# Patient Record
Sex: FEMALE
[De-identification: names, ages, dates, MRNs, and addresses within clinical notes are randomized per-mention and may not be internally consistent; named-entity substitution may affect disease eponyms.]

---

## 2017-03-20 ENCOUNTER — CHARTING TRANS (OUTPATIENT)
Dept: OTHER | Age: 42
End: 2017-03-20

## 2017-05-19 PROBLEM — Z80.3 FAMILY HISTORY OF BREAST CANCER IN MOTHER: Status: ACTIVE | Noted: 2017-05-19

## 2017-05-19 PROCEDURE — 87624 HPV HI-RISK TYP POOLED RSLT: CPT | Performed by: OBSTETRICS & GYNECOLOGY

## 2017-05-19 PROCEDURE — 88175 CYTOPATH C/V AUTO FLUID REDO: CPT | Performed by: OBSTETRICS & GYNECOLOGY

## 2017-08-30 ENCOUNTER — HOSPITAL ENCOUNTER (OUTPATIENT)
Dept: MAMMOGRAPHY | Age: 42
Discharge: HOME | End: 2017-08-30
Attending: OBSTETRICS & GYNECOLOGY
Payer: COMMERCIAL

## 2017-08-30 DIAGNOSIS — Z12.31 ENCOUNTER FOR SCREENING MAMMOGRAM FOR MALIGNANT NEOPLASM OF BREAST: ICD-10-CM

## 2017-08-30 PROCEDURE — 77067 SCR MAMMO BI INCL CAD: CPT | Performed by: OBSTETRICS & GYNECOLOGY

## 2017-08-30 PROCEDURE — 77063 BREAST TOMOSYNTHESIS BI: CPT | Performed by: OBSTETRICS & GYNECOLOGY

## 2018-01-07 ENCOUNTER — HOSPITAL ENCOUNTER (EMERGENCY)
Age: 43
Discharge: HOME | End: 2018-01-07
Payer: COMMERCIAL

## 2018-01-07 ENCOUNTER — APPOINTMENT (OUTPATIENT)
Dept: GENERAL RADIOLOGY | Age: 43
End: 2018-01-07
Attending: EMERGENCY MEDICINE
Payer: COMMERCIAL

## 2018-01-07 VITALS
DIASTOLIC BLOOD PRESSURE: 77 MMHG | BODY MASS INDEX: 20 KG/M2 | HEART RATE: 66 BPM | WEIGHT: 141 LBS | TEMPERATURE: 100 F | SYSTOLIC BLOOD PRESSURE: 140 MMHG | OXYGEN SATURATION: 100 % | RESPIRATION RATE: 16 BRPM

## 2018-01-07 DIAGNOSIS — S63.501A RIGHT WRIST SPRAIN, INITIAL ENCOUNTER: Primary | ICD-10-CM

## 2018-01-07 PROCEDURE — 73110 X-RAY EXAM OF WRIST: CPT | Performed by: EMERGENCY MEDICINE

## 2018-01-07 PROCEDURE — 99283 EMERGENCY DEPT VISIT LOW MDM: CPT

## 2018-01-07 RX ORDER — IBUPROFEN 600 MG/1
600 TABLET ORAL ONCE
Status: COMPLETED | OUTPATIENT
Start: 2018-01-07 | End: 2018-01-07

## 2018-01-08 PROBLEM — S69.91XA INJURY OF RIGHT WRIST, INITIAL ENCOUNTER: Status: ACTIVE | Noted: 2018-01-08

## 2018-01-08 NOTE — ED PROVIDER NOTES
Patient Seen in: [facility] Emergency Department In [place]    History   Patient presents with:  Upper Extremity Injury (musculoskeletal)    Stated Complaint: right wrist injury/fall    HPI    CHIEF COMPLAINT: Right wrist pain status post fall    HISTORY Physical Exam   ED Triage Vitals [01/07/18 8238]  BP: (!) 161/94  Pulse: 63  Resp: 18  Temp: 99.8 °F (37.7 °C)  Temp src: Temporal  SpO2: 100 %  O2 Device: None (Room air)    Current:BP (!) 161/94   Pulse 63   Temp 99.8 °F (37.7 °C) (Temporal)   Resp 18 Patiently placed in a Velcro wrist splint with thumb spica and have close follow-up with orthopedics. No acute fracture seen on x-ray. I discussed the radiology results with the patient.  I discussed the diagnosis and need for followup with their physicia

## 2018-01-23 PROBLEM — S69.91XD INJURY OF RIGHT WRIST, SUBSEQUENT ENCOUNTER: Status: ACTIVE | Noted: 2018-01-23

## 2018-01-30 PROBLEM — S52.501A CLOSED FRACTURE OF DISTAL END OF RIGHT RADIUS, UNSPECIFIED FRACTURE MORPHOLOGY, INITIAL ENCOUNTER: Status: ACTIVE | Noted: 2018-01-30

## 2018-02-16 PROBLEM — S52.501D CLOSED FRACTURE OF DISTAL END OF RIGHT RADIUS WITH ROUTINE HEALING, UNSPECIFIED FRACTURE MORPHOLOGY, SUBSEQUENT ENCOUNTER: Status: ACTIVE | Noted: 2018-02-16

## 2018-03-12 PROBLEM — M25.531 RIGHT WRIST PAIN: Status: ACTIVE | Noted: 2018-03-12

## 2018-05-09 PROCEDURE — 87624 HPV HI-RISK TYP POOLED RSLT: CPT | Performed by: OBSTETRICS & GYNECOLOGY

## 2018-05-09 PROCEDURE — 88175 CYTOPATH C/V AUTO FLUID REDO: CPT | Performed by: OBSTETRICS & GYNECOLOGY

## 2018-05-29 PROBLEM — M77.8 TENDINITIS OF RIGHT WRIST: Status: ACTIVE | Noted: 2018-05-29

## 2018-10-24 ENCOUNTER — OFFICE VISIT (OUTPATIENT)
Dept: FAMILY MEDICINE CLINIC | Facility: CLINIC | Age: 43
End: 2018-10-24
Payer: COMMERCIAL

## 2018-10-24 VITALS
DIASTOLIC BLOOD PRESSURE: 78 MMHG | HEIGHT: 70.87 IN | OXYGEN SATURATION: 98 % | HEART RATE: 64 BPM | RESPIRATION RATE: 20 BRPM | WEIGHT: 138 LBS | BODY MASS INDEX: 19.32 KG/M2 | TEMPERATURE: 98 F | SYSTOLIC BLOOD PRESSURE: 104 MMHG

## 2018-10-24 DIAGNOSIS — R39.9 UTI SYMPTOMS: Primary | ICD-10-CM

## 2018-10-24 PROCEDURE — 81003 URINALYSIS AUTO W/O SCOPE: CPT | Performed by: NURSE PRACTITIONER

## 2018-10-24 PROCEDURE — 99202 OFFICE O/P NEW SF 15 MIN: CPT | Performed by: NURSE PRACTITIONER

## 2018-10-24 PROCEDURE — 87186 SC STD MICRODIL/AGAR DIL: CPT | Performed by: NURSE PRACTITIONER

## 2018-10-24 PROCEDURE — 87088 URINE BACTERIA CULTURE: CPT | Performed by: NURSE PRACTITIONER

## 2018-10-24 PROCEDURE — 87086 URINE CULTURE/COLONY COUNT: CPT | Performed by: NURSE PRACTITIONER

## 2018-10-24 RX ORDER — NITROFURANTOIN 25; 75 MG/1; MG/1
100 CAPSULE ORAL 2 TIMES DAILY
Qty: 14 CAPSULE | Refills: 0 | Status: SHIPPED | OUTPATIENT
Start: 2018-10-24 | End: 2018-10-31

## 2018-10-24 NOTE — PROGRESS NOTES
CHIEF COMPLAINT:   Patient presents with:  Urinary Frequency: s/s for 3 days. OTC AZO used. Burning On Urination: very mild    HPI:   Jesus Saha is a 44year old female who presents with symptoms of UTI.  Complaining of urinary frequency, urge Fluocinolone Acetonide (DERMA-SMOOTHE/FS SCALP) 0.01 % Apply Externally Oil dampen hair, then massage thoroughly into scalp and cover with shower cap/ leave on overnight and wash out in AM Disp: 118 mL Rfl: 1      History reviewed.  No pertinent past medica [employer] Expiration Date 08/31/2019 Date         ASSESSMENT AND PLAN:   Yoandy Ferrera is a 44year old female presents with UTI symptoms. ASSESSMENT:  Uti symptoms  (primary encounter diagnosis)  1.  UTI symptoms  - URINALYSIS, AUTO, W/O SCOPE  - Symptoms  The infection causes inflammation in the urethra and bladder. This causes many of the symptoms.  The most common symptoms of a bladder infection are:  · Pain or burning when urinating  · Having to urinate more often than usual  · Urgent need to ur · You can use acetaminophen or ibuprofen for pain, fever, or discomfort, unless another medicine was prescribed. If you have chronic liver or kidney disease, talk with your healthcare provider before using these medicines.  Also talk with your provider if y · Fainting or loss of consciousness  · Rapid heart rate  When to seek medical advice  Call your healthcare provider right away if any of these occur:  · Fever of 100.4ºF (38. 0ºC) or higher, or as directed by your healthcare provider  · Symptoms are not bet

## 2018-11-05 VITALS
HEART RATE: 64 BPM | RESPIRATION RATE: 18 BRPM | HEIGHT: 70 IN | TEMPERATURE: 97.3 F | WEIGHT: 152 LBS | SYSTOLIC BLOOD PRESSURE: 116 MMHG | BODY MASS INDEX: 21.76 KG/M2 | DIASTOLIC BLOOD PRESSURE: 68 MMHG

## 2018-11-13 ENCOUNTER — OFFICE VISIT (OUTPATIENT)
Dept: FAMILY MEDICINE CLINIC | Facility: CLINIC | Age: 43
End: 2018-11-13
Payer: COMMERCIAL

## 2018-11-13 VITALS
HEIGHT: 70 IN | SYSTOLIC BLOOD PRESSURE: 98 MMHG | TEMPERATURE: 98 F | OXYGEN SATURATION: 98 % | BODY MASS INDEX: 19.18 KG/M2 | WEIGHT: 134 LBS | RESPIRATION RATE: 14 BRPM | DIASTOLIC BLOOD PRESSURE: 66 MMHG | HEART RATE: 92 BPM

## 2018-11-13 DIAGNOSIS — J01.90 ACUTE VIRAL SINUSITIS: Primary | ICD-10-CM

## 2018-11-13 DIAGNOSIS — B97.89 ACUTE VIRAL SINUSITIS: Primary | ICD-10-CM

## 2018-11-13 PROCEDURE — 99213 OFFICE O/P EST LOW 20 MIN: CPT | Performed by: NURSE PRACTITIONER

## 2018-11-13 RX ORDER — PYRIDOXINE HCL (VITAMIN B6) 50 MG
TABLET ORAL
COMMUNITY

## 2018-11-13 NOTE — PROGRESS NOTES
CHIEF COMPLAINT:   Patient presents with:  Nasal Congestion: PND, last night      HPI:   Sonia Schrader is a 44year old female who presents for cold symptoms since last night.   Symptoms have progressed into sinus congestion and been worsening since on • Breast Cancer Mother 62        57   • Other (hasimotos thyroiditis) Mother    • Breast Cancer Paternal Grandmother 57        63   • Hypertension Father    • Prostate Cancer Father 72   • Skin cancer Father 72   • Other (parkinsons) Father       Social Hi PLAN: Meds and  Comfort care instructions as listed in Patient Instructions. Advised to return to clinic before her [place] trip if no improvement or worsening in symptoms.     Meds & Refills for this Visit:  Requested Prescriptions      No prescriptions re · You can use an over-the-counter decongestant, unless a similar medicine was prescribed to you. Nasal sprays work the fastest. Use one that contains phenylephrine or oxymetazoline. First blow your nose gently. Then use the spray.  Do not use these medicine © 1338-8848 [employer] [address] [employer], [address]. All rights reserved. This information is not intended as a substitute for professional medical care. Always follow your healthcare professional's instructions.             The

## 2018-11-13 NOTE — PATIENT INSTRUCTIONS
Humidifier in room  Sleep propped  Push fluids  Limit dairy  Mucinex as directed  Sudafed as needed  Flonase daily  Benadryl at night  For the flight:  Sudafed 30 min prior to flight. Afrin as you are ascending and descending.   Follow up in 7 days if no · You can use an over-the-counter decongestant, unless a similar medicine was prescribed to you. Nasal sprays work the fastest. Use one that contains phenylephrine or oxymetazoline. First blow your nose gently. Then use the spray.  Do not use these medicine © 9834-9286 [employer] [address]. [address] [facility] [place], [facility] [place]. All rights reserved. This information is not intended as a substitute for professional medical care. Always follow your healthcare professional's instructions.

## 2018-11-19 ENCOUNTER — OFFICE VISIT (OUTPATIENT)
Dept: FAMILY MEDICINE CLINIC | Facility: CLINIC | Age: 43
End: 2018-11-19
Payer: COMMERCIAL

## 2018-11-19 VITALS
HEIGHT: 70 IN | SYSTOLIC BLOOD PRESSURE: 108 MMHG | BODY MASS INDEX: 19.18 KG/M2 | HEART RATE: 88 BPM | WEIGHT: 134 LBS | DIASTOLIC BLOOD PRESSURE: 68 MMHG | RESPIRATION RATE: 16 BRPM | TEMPERATURE: 98 F | OXYGEN SATURATION: 99 %

## 2018-11-19 DIAGNOSIS — J98.01 BRONCHOSPASM: ICD-10-CM

## 2018-11-19 DIAGNOSIS — J01.00 ACUTE MAXILLARY SINUSITIS, RECURRENCE NOT SPECIFIED: Primary | ICD-10-CM

## 2018-11-19 PROCEDURE — 99213 OFFICE O/P EST LOW 20 MIN: CPT | Performed by: NURSE PRACTITIONER

## 2018-11-19 RX ORDER — AMOXICILLIN AND CLAVULANATE POTASSIUM 875; 125 MG/1; MG/1
1 TABLET, FILM COATED ORAL 2 TIMES DAILY
Qty: 20 TABLET | Refills: 0 | Status: SHIPPED | OUTPATIENT
Start: 2018-11-19 | End: 2018-11-29

## 2018-11-19 RX ORDER — ALBUTEROL SULFATE 90 UG/1
2 AEROSOL, METERED RESPIRATORY (INHALATION) EVERY 4 HOURS PRN
Qty: 1 INHALER | Refills: 0 | Status: SHIPPED | OUTPATIENT
Start: 2018-11-19 | End: 2018-12-03

## 2018-11-19 NOTE — PATIENT INSTRUCTIONS
Humidifier in room  Sleep propped  Push fluids  Limit dairy  Mucinex as directed  Sudafed as needed  Benadryl at night  Flonase daily  Sudafed and Afrin for flight    Sinusitis (Antibiotic Treatment)    The sinuses are air-filled spaces within the bones · You can use an over-the-counter decongestant, unless a similar medicine was prescribed to you. Nasal sprays work the fastest. Use one that contains phenylephrine or oxymetazoline. First blow your nose gently. Then use the spray.  Do not use these medicine · Don’t have close contact with people who have sore throats, colds, or other upper respiratory infections. · Don’t smoke, and stay away from secondhand smoke. · Stay up to date with of your vaccines.   Date Last Reviewed: 11/1/2017  © 3794-4297 [employer]

## 2018-11-19 NOTE — PROGRESS NOTES
CHIEF COMPLAINT:   Patient presents with:  Post Nasal Drip: s/s for 1 week. OTC meds taken  Sinus Problem: pressure under eye  Cough: dry/wet      HPI:   Arin Herrera is a 44year old female who presents for cold symptoms for  1  weeks.  Symptoms hav Pyridoxine HCl (VITAMIN B-6 OR) Take  by mouth. Disp:  Rfl:    Multiple Vitamins-Minerals (MULTIPLE VITAMINS/WOMENS OR) Take  by mouth. Disp:  Rfl:       History reviewed. No pertinent past medical history.    Past Surgical History:   Procedure Laterality D CARDIO: RRR without murmur  EXTREMITIES: no cyanosis, clubbing or edema  LYMPH:  + anterior cervical lymphadenopathy.         ASSESSMENT AND PLAN:   Gurmeet Moreno is a 44year old female who presents with URI symptoms that are consistent with:      ASS · Drink plenty of water, hot tea, and other liquids. This may help thin nasal mucus. It also may help your sinuses drain fluids. · Heat may help soothe painful areas of your face. Use a towel soaked in hot water.  Or,  the shower and direct the war Call your healthcare provider if any of these occur:  · Facial pain or headache that gets worse  · Stiff neck  · Unusual drowsiness or confusion  · Swelling of your forehead or eyelids  · Vision problems, such as blurred or double vision  · Fever of 100.4º

## 2019-05-22 PROCEDURE — 88175 CYTOPATH C/V AUTO FLUID REDO: CPT | Performed by: OBSTETRICS & GYNECOLOGY

## 2019-05-22 PROCEDURE — 87624 HPV HI-RISK TYP POOLED RSLT: CPT | Performed by: OBSTETRICS & GYNECOLOGY

## 2019-06-09 ENCOUNTER — OFFICE VISIT (OUTPATIENT)
Dept: FAMILY MEDICINE CLINIC | Facility: CLINIC | Age: 44
End: 2019-06-09
Payer: COMMERCIAL

## 2019-06-09 VITALS
RESPIRATION RATE: 18 BRPM | WEIGHT: 135 LBS | HEART RATE: 87 BPM | BODY MASS INDEX: 19.33 KG/M2 | DIASTOLIC BLOOD PRESSURE: 66 MMHG | SYSTOLIC BLOOD PRESSURE: 110 MMHG | TEMPERATURE: 98 F | OXYGEN SATURATION: 98 % | HEIGHT: 70 IN

## 2019-06-09 DIAGNOSIS — J01.00 ACUTE NON-RECURRENT MAXILLARY SINUSITIS: Primary | ICD-10-CM

## 2019-06-09 PROCEDURE — 99213 OFFICE O/P EST LOW 20 MIN: CPT | Performed by: NURSE PRACTITIONER

## 2019-06-09 RX ORDER — FLUTICASONE PROPIONATE 50 MCG
2 SPRAY, SUSPENSION (ML) NASAL DAILY
Qty: 1 BOTTLE | Refills: 0 | Status: SHIPPED | OUTPATIENT
Start: 2019-06-09

## 2019-06-09 RX ORDER — AMOXICILLIN AND CLAVULANATE POTASSIUM 875; 125 MG/1; MG/1
1 TABLET, FILM COATED ORAL 2 TIMES DAILY
Qty: 20 TABLET | Refills: 0 | Status: SHIPPED | OUTPATIENT
Start: 2019-06-09 | End: 2019-06-19

## 2019-06-09 NOTE — PROGRESS NOTES
CHIEF COMPLAINT:   Patient presents with:  Sinus Problem: facial pressure, chest congestion/cough // x2.5 weeks    HPI:   Flores Eid is a 38year old female who presents for sinus congestion for  2.5  weeks.  Symptoms have been worsening since onset Smokeless tobacco: Never Used    Alcohol use: No    Drug use: No        REVIEW OF SYSTEMS:   GENERAL: feels well otherwise, no unplanned weight change,  good appetite  SKIN: no rashes or abnormal skin lesions  HEENT: See HPI.     LUNGS: denies shortness Stop afrin. Start flonase. PLAN: Meds and instructions as below. Comfort care instructions as listed in Patient Instructions. To f/u with PCP if no improvement in 3-5 days or sooner if sx worsen.     Meds & Refills for this Visit:  Requested Prescription · You can use an over-the-counter decongestant, unless a similar medicine was prescribed to you. Nasal sprays work the fastest. Use one that contains phenylephrine or oxymetazoline. First blow your nose gently. Then use the spray.  Do not use these medicine · Don’t have close contact with people who have sore throats, colds, or other upper respiratory infections. · Don’t smoke, and stay away from secondhand smoke. · Stay up to date with of your vaccines.   Date Last Reviewed: 11/1/2017  © 3399-3017 [employer]

## 2019-10-03 ENCOUNTER — HOSPITAL ENCOUNTER (OUTPATIENT)
Dept: MAMMOGRAPHY | Age: 44
Discharge: HOME | End: 2019-10-03
Attending: OBSTETRICS & GYNECOLOGY
Payer: COMMERCIAL

## 2019-10-03 DIAGNOSIS — Z01.419 ENCOUNTER FOR ANNUAL ROUTINE GYNECOLOGICAL EXAMINATION: ICD-10-CM

## 2019-10-03 PROCEDURE — 77067 SCR MAMMO BI INCL CAD: CPT | Performed by: OBSTETRICS & GYNECOLOGY

## 2019-10-03 PROCEDURE — 77063 BREAST TOMOSYNTHESIS BI: CPT | Performed by: OBSTETRICS & GYNECOLOGY

## 2019-10-04 ENCOUNTER — HOSPITAL ENCOUNTER (OUTPATIENT)
Dept: ULTRASOUND IMAGING | Age: 44
Discharge: HOME | End: 2019-10-04
Attending: OBSTETRICS & GYNECOLOGY
Payer: COMMERCIAL

## 2019-10-04 ENCOUNTER — HOSPITAL ENCOUNTER (OUTPATIENT)
Dept: MAMMOGRAPHY | Age: 44
Discharge: HOME | End: 2019-10-04
Attending: OBSTETRICS & GYNECOLOGY
Payer: COMMERCIAL

## 2019-10-04 DIAGNOSIS — R92.2 INCONCLUSIVE MAMMOGRAM: ICD-10-CM

## 2019-10-04 PROCEDURE — 76641 ULTRASOUND BREAST COMPLETE: CPT | Performed by: OBSTETRICS & GYNECOLOGY

## 2019-10-04 PROCEDURE — 77061 BREAST TOMOSYNTHESIS UNI: CPT | Performed by: OBSTETRICS & GYNECOLOGY

## 2019-10-04 PROCEDURE — 77065 DX MAMMO INCL CAD UNI: CPT | Performed by: OBSTETRICS & GYNECOLOGY

## 2020-02-07 PROBLEM — S93.492A SPRAIN OF ANTERIOR TALOFIBULAR LIGAMENT OF LEFT ANKLE, INITIAL ENCOUNTER: Status: ACTIVE | Noted: 2020-02-07

## 2020-02-07 PROBLEM — S82.892A AVULSION FRACTURE OF ANKLE, LEFT, CLOSED, INITIAL ENCOUNTER: Status: ACTIVE | Noted: 2020-02-07

## 2021-12-14 PROBLEM — M70.52 INFRAPATELLAR BURSITIS OF LEFT KNEE: Status: ACTIVE | Noted: 2021-12-14

## 2021-12-14 PROBLEM — S93.492D SPRAIN OF ANTERIOR TALOFIBULAR LIGAMENT OF LEFT ANKLE, SUBSEQUENT ENCOUNTER: Status: ACTIVE | Noted: 2020-02-07

## 2021-12-14 PROBLEM — S82.892D: Status: ACTIVE | Noted: 2020-02-07

## 2022-09-29 ENCOUNTER — HOSPITAL ENCOUNTER (OUTPATIENT)
Dept: MAMMOGRAPHY | Age: 47
Discharge: HOME | End: 2022-09-29
Attending: INTERNAL MEDICINE
Payer: COMMERCIAL

## 2022-09-29 DIAGNOSIS — Z12.31 VISIT FOR SCREENING MAMMOGRAM: ICD-10-CM

## 2022-09-29 PROCEDURE — 77067 SCR MAMMO BI INCL CAD: CPT | Performed by: INTERNAL MEDICINE

## 2022-09-29 PROCEDURE — 77067 SCR MAMMO BI INCL CAD: CPT | Performed by: OBSTETRICS & GYNECOLOGY

## 2022-09-29 PROCEDURE — 77063 BREAST TOMOSYNTHESIS BI: CPT | Performed by: OBSTETRICS & GYNECOLOGY

## 2022-09-29 PROCEDURE — 77063 BREAST TOMOSYNTHESIS BI: CPT | Performed by: INTERNAL MEDICINE

## 2022-09-30 ENCOUNTER — HOSPITAL ENCOUNTER (OUTPATIENT)
Dept: MAMMOGRAPHY | Facility: HOSPITAL | Age: 47
Discharge: HOME | End: 2022-09-30
Attending: OBSTETRICS & GYNECOLOGY
Payer: COMMERCIAL

## 2022-09-30 ENCOUNTER — NURSE NAVIGATOR ENCOUNTER (OUTPATIENT)
Dept: HEMATOLOGY/ONCOLOGY | Facility: HOSPITAL | Age: 47
End: 2022-09-30

## 2022-09-30 DIAGNOSIS — R92.2 INCONCLUSIVE MAMMOGRAM: ICD-10-CM

## 2022-09-30 PROCEDURE — 77065 DX MAMMO INCL CAD UNI: CPT | Performed by: OBSTETRICS & GYNECOLOGY

## 2022-09-30 PROCEDURE — 77061 BREAST TOMOSYNTHESIS UNI: CPT | Performed by: OBSTETRICS & GYNECOLOGY

## 2022-09-30 NOTE — PROGRESS NOTES
Patient called in regards to stating that she saw the breast nurse navigators phone number on her mammogram because she stated that at the time of her mammogram she informed the staff that her ordering physician was not Dr. Sarthak Garcia for the mammogram it was Dr. Jose Luis Higginbotham. I stated that I received a voicemail from Dr. Tori Zhu office today about how to order the additional view mammogram and I was planning on calling them back to help them. I stated that I cannot change the ordering physician for her mammogram but I can contact the radiology department and find out how we can change the ordering physician on her mammogram. The patient stated she lives in [place] and is expected to go back there soon from [place] and would like to get her additional view mammogram scheduled as soon as possible. I stated that I will call radiology and try and get her order in and provided her central scheduling phone number for her to schedule her mammogram. She thanked me for the assistance and pt was provided with the breast nurse navigators contact information and was encouraged to phone with any other questions or concerns.

## 2022-09-30 NOTE — PROGRESS NOTES
Valerie Albarran, from Dr. Chalino Mayes office, in regards to the voicemail she left about how to place the order for patient's right breast additional view mammogram. I stated that I spoke to the [facility] radiology department and that the order was faxed to Dr. Mitzy Murray and the patient stated the order needs to placed under Dr. Edie Schrader. I stated that radiology is working on trying to fix the order physician and in the meantime she can order the right breast diagnostic additional view mammogram because the patient stated to me that she is returning to her home in [place] and would like to have this mammogram schedule as soon as possible. I provided the Cancer [facility]'s fax number for the order to be faxed over and I will bring the order once it is received to radiology so that the patient can schedule it.

## 2022-09-30 NOTE — IMAGING NOTE
This Breast Care RN assisted Dr. Magalys Ospina with recommendation for a right breast 2 site stereotactic biopsy for calcifications. Procedure reviewed and all questions answered. Emotional and educational support given. On the day of the biopsy, pt instructed to take Tylenol 1000mg PO, eat a light meal & bring or wear a sports bra. Post biopsy care also reviewed with pt to include NO lifting more than 5lbs, no exercising or housework (limit upper body movement) for 24-48 hrs post biopsy. Patient denies blood thinners, bleeding disorders, liver disease, and chemo. Pt verbalized understanding. Our breast center schedulers will be calling to schedule an appt that is convenient for pt.

## 2022-12-18 ENCOUNTER — APPOINTMENT (OUTPATIENT)
Dept: GENERAL RADIOLOGY | Age: 47
End: 2022-12-18
Attending: PHYSICIAN ASSISTANT
Payer: COMMERCIAL

## 2022-12-18 ENCOUNTER — APPOINTMENT (OUTPATIENT)
Dept: CT IMAGING | Age: 47
End: 2022-12-18
Attending: PHYSICIAN ASSISTANT
Payer: COMMERCIAL

## 2022-12-18 ENCOUNTER — HOSPITAL ENCOUNTER (OUTPATIENT)
Age: 47
Discharge: HOME | End: 2022-12-18
Payer: COMMERCIAL

## 2022-12-18 VITALS
HEART RATE: 90 BPM | SYSTOLIC BLOOD PRESSURE: 115 MMHG | BODY MASS INDEX: 19.61 KG/M2 | WEIGHT: 137 LBS | RESPIRATION RATE: 20 BRPM | OXYGEN SATURATION: 98 % | DIASTOLIC BLOOD PRESSURE: 75 MMHG | HEIGHT: 70 IN | TEMPERATURE: 99 F

## 2022-12-18 DIAGNOSIS — S00.83XA CONTUSION OF FACE, INITIAL ENCOUNTER: Primary | ICD-10-CM

## 2022-12-18 DIAGNOSIS — T14.8XXA ABRASION: ICD-10-CM

## 2022-12-18 DIAGNOSIS — S09.90XA INJURY OF HEAD, INITIAL ENCOUNTER: ICD-10-CM

## 2022-12-18 DIAGNOSIS — S22.31XA CLOSED FRACTURE OF ONE RIB OF RIGHT SIDE, INITIAL ENCOUNTER: ICD-10-CM

## 2022-12-18 DIAGNOSIS — S80.02XA CONTUSION OF LEFT KNEE, INITIAL ENCOUNTER: ICD-10-CM

## 2022-12-18 LAB — B-HCG UR QL: NEGATIVE

## 2022-12-18 PROCEDURE — 70450 CT HEAD/BRAIN W/O DYE: CPT | Performed by: PHYSICIAN ASSISTANT

## 2022-12-18 PROCEDURE — 81025 URINE PREGNANCY TEST: CPT

## 2022-12-18 PROCEDURE — 99204 OFFICE O/P NEW MOD 45 MIN: CPT

## 2022-12-18 PROCEDURE — 71101 X-RAY EXAM UNILAT RIBS/CHEST: CPT | Performed by: PHYSICIAN ASSISTANT

## 2022-12-18 PROCEDURE — 99214 OFFICE O/P EST MOD 30 MIN: CPT

## 2022-12-18 PROCEDURE — 73562 X-RAY EXAM OF KNEE 3: CPT | Performed by: PHYSICIAN ASSISTANT

## 2022-12-18 RX ORDER — IBUPROFEN 600 MG/1
600 TABLET ORAL EVERY 8 HOURS PRN
Qty: 30 TABLET | Refills: 0 | Status: SHIPPED | OUTPATIENT
Start: 2022-12-18 | End: 2022-12-25

## 2022-12-18 RX ORDER — CYCLOBENZAPRINE HCL 10 MG
10 TABLET ORAL 3 TIMES DAILY PRN
Qty: 20 TABLET | Refills: 0 | Status: SHIPPED | OUTPATIENT
Start: 2022-12-18 | End: 2022-12-25

## 2022-12-18 NOTE — DISCHARGE INSTRUCTIONS
Use incentive spirometer as directed    Ibuprofen up to 3 times a day with food, you can also take muscle relaxant this medication can make you drowsy its best to be taken before    Ice to the area and rest

## 2022-12-18 NOTE — ED INITIAL ASSESSMENT (HPI)
Around 0400 pt fell down approx 10 carpeted stairs; pt hit R side of head/eye, R ribs, L knee    No loc/vom

## (undated) NOTE — ED AVS SNAPSHOT
Anderson Sanabria   MRN: [id number]    Department:  [facility] Emergency Department in [place]   Date of Visit:  1/7/2018           Disclosure     Insurance plans vary and the physician(s) referred by the ER may not be covered by your plan.  Please cont tell this physician (or your personal doctor if your instructions are to return to your personal doctor) about any new or lasting problems. The primary care or specialist physician will see patients referred from the [facility] Emergency Department.  Librado Finn